# Patient Record
Sex: MALE | URBAN - METROPOLITAN AREA
[De-identification: names, ages, dates, MRNs, and addresses within clinical notes are randomized per-mention and may not be internally consistent; named-entity substitution may affect disease eponyms.]

---

## 2020-07-28 ENCOUNTER — APPOINTMENT (OUTPATIENT)
Age: 75
Setting detail: DERMATOLOGY
End: 2020-07-28

## 2020-07-28 VITALS — RESPIRATION RATE: 18 BRPM | HEIGHT: 72 IN | WEIGHT: 150 LBS

## 2020-07-28 DIAGNOSIS — D485 NEOPLASM OF UNCERTAIN BEHAVIOR OF SKIN: ICD-10-CM

## 2020-07-28 DIAGNOSIS — L57.0 ACTINIC KERATOSIS: ICD-10-CM

## 2020-07-28 PROBLEM — D48.5 NEOPLASM OF UNCERTAIN BEHAVIOR OF SKIN: Status: ACTIVE | Noted: 2020-07-28

## 2020-07-28 PROCEDURE — OTHER BIOPSY BY SHAVE METHOD: OTHER

## 2020-07-28 PROCEDURE — 17000 DESTRUCT PREMALG LESION: CPT | Mod: 59

## 2020-07-28 PROCEDURE — OTHER COUNSELING: OTHER

## 2020-07-28 PROCEDURE — 17003 DESTRUCT PREMALG LES 2-14: CPT

## 2020-07-28 PROCEDURE — OTHER LIQUID NITROGEN: OTHER

## 2020-07-28 PROCEDURE — OTHER PHOTO-DOCUMENTATION: OTHER

## 2020-07-28 PROCEDURE — 11103 TANGNTL BX SKIN EA SEP/ADDL: CPT

## 2020-07-28 PROCEDURE — 11102 TANGNTL BX SKIN SINGLE LES: CPT

## 2020-07-28 PROCEDURE — OTHER MIPS QUALITY: OTHER

## 2020-07-28 ASSESSMENT — LOCATION SIMPLE DESCRIPTION DERM
LOCATION SIMPLE: LEFT FOREHEAD
LOCATION SIMPLE: RIGHT TEMPLE
LOCATION SIMPLE: RIGHT CHEEK
LOCATION SIMPLE: LEFT EAR
LOCATION SIMPLE: SCALP
LOCATION SIMPLE: LEFT TEMPLE

## 2020-07-28 ASSESSMENT — LOCATION DETAILED DESCRIPTION DERM
LOCATION DETAILED: LEFT SUPERIOR MEDIAL FOREHEAD
LOCATION DETAILED: LEFT CENTRAL TEMPLE
LOCATION DETAILED: LEFT SUPERIOR HELIX
LOCATION DETAILED: RIGHT SUPERIOR CENTRAL MALAR CHEEK
LOCATION DETAILED: RIGHT CENTRAL TEMPLE
LOCATION DETAILED: LEFT FOREHEAD
LOCATION DETAILED: RIGHT SUPERIOR PARIETAL SCALP

## 2020-07-28 ASSESSMENT — LOCATION ZONE DERM
LOCATION ZONE: SCALP
LOCATION ZONE: EAR
LOCATION ZONE: FACE

## 2020-07-28 NOTE — PROCEDURE: MIPS QUALITY
Quality 402: Tobacco Use And Help With Quitting Among Adolescents: Patient screened for tobacco and is an ex-smoker
Quality 130: Documentation Of Current Medications In The Medical Record: Current Medications Documented
Quality 111:Pneumonia Vaccination Status For Older Adults: Pneumococcal Vaccination Previously Received
Quality 431: Preventive Care And Screening: Unhealthy Alcohol Use - Screening: Documentation of medical reason(s) for not screening for unhealthy alcohol use (eg, limited life expectancy, other medical reasons)
Detail Level: Detailed
Quality 226: Preventive Care And Screening: Tobacco Use: Screening And Cessation Intervention: Patient screened for tobacco use and is an ex/non-smoker

## 2020-07-28 NOTE — PROCEDURE: BIOPSY BY SHAVE METHOD
Hide Biopsy Depth?: No
X Size Of Lesion In Cm: 0
Consent: Written consent was obtained and risks were reviewed including but not limited to scarring, infection, bleeding, scabbing, incomplete removal, nerve damage and allergy to anesthesia.
Post-Care Instructions: I reviewed with the patient in detail post-care instructions. Patient is to keep the biopsy site dry overnight, and then apply bacitracin twice daily until healed. Patient may apply hydrogen peroxide soaks to remove any crusting.
Hemostasis: Drysol
Curettage Text: The wound bed was treated with curettage after the biopsy was performed.
Biopsy Type: H and E
Depth Of Biopsy: dermis
Information: Selecting Yes will display possible errors in your note based on the variables you have selected. This validation is only offered as a suggestion for you. PLEASE NOTE THAT THE VALIDATION TEXT WILL BE REMOVED WHEN YOU FINALIZE YOUR NOTE. IF YOU WANT TO FAX A PRELIMINARY NOTE YOU WILL NEED TO TOGGLE THIS TO 'NO' IF YOU DO NOT WANT IT IN YOUR FAXED NOTE.
Silver Nitrate Text: The wound bed was treated with silver nitrate after the biopsy was performed.
Detail Level: Detailed
Dressing: bandage
Anesthesia Type: 1% lidocaine with epinephrine
Notification Instructions: Patient will be notified of biopsy results. However, patient instructed to call the office if not contacted within 2 weeks.
Type Of Destruction Used: Curettage
Electrodesiccation And Curettage Text: The wound bed was treated with electrodesiccation and curettage after the biopsy was performed.
Biopsy Method: Dermablade
Cryotherapy Text: The wound bed was treated with cryotherapy after the biopsy was performed.
Billing Type: Third-Party Bill
Wound Care: Petrolatum
Electrodesiccation Text: The wound bed was treated with electrodesiccation after the biopsy was performed.
Was A Bandage Applied: Yes
Anesthesia Volume In Cc (Will Not Render If 0): 0.5

## 2020-08-25 ENCOUNTER — APPOINTMENT (OUTPATIENT)
Age: 75
Setting detail: DERMATOLOGY
End: 2020-08-25

## 2020-08-25 VITALS — WEIGHT: 150 LBS | TEMPERATURE: 97.5 F | RESPIRATION RATE: 18 BRPM | HEIGHT: 72 IN

## 2020-08-25 DIAGNOSIS — D485 NEOPLASM OF UNCERTAIN BEHAVIOR OF SKIN: ICD-10-CM

## 2020-08-25 PROBLEM — D48.5 NEOPLASM OF UNCERTAIN BEHAVIOR OF SKIN: Status: ACTIVE | Noted: 2020-08-25

## 2020-08-25 PROCEDURE — OTHER COUNSELING: OTHER

## 2020-08-25 PROCEDURE — 11102 TANGNTL BX SKIN SINGLE LES: CPT

## 2020-08-25 PROCEDURE — OTHER ADDITIONAL NOTES: OTHER

## 2020-08-25 PROCEDURE — OTHER BIOPSY BY SHAVE METHOD: OTHER

## 2020-08-25 ASSESSMENT — LOCATION DETAILED DESCRIPTION DERM: LOCATION DETAILED: RIGHT SUPERIOR PARIETAL SCALP

## 2020-08-25 ASSESSMENT — LOCATION SIMPLE DESCRIPTION DERM: LOCATION SIMPLE: SCALP

## 2020-08-25 ASSESSMENT — LOCATION ZONE DERM: LOCATION ZONE: SCALP

## 2020-08-25 NOTE — PROCEDURE: BIOPSY BY SHAVE METHOD
Detail Level: Detailed
Hemostasis: Drysol
Silver Nitrate Text: The wound bed was treated with silver nitrate after the biopsy was performed.
Curettage Text: The wound bed was treated with curettage after the biopsy was performed.
Bill 21757 For Specimen Handling/Conveyance To Laboratory?: no
Cryotherapy Text: The wound bed was treated with cryotherapy after the biopsy was performed.
Depth Of Biopsy: dermis
Additional Anesthesia Volume In Cc (Will Not Render If 0): 0
Information: Selecting Yes will display possible errors in your note based on the variables you have selected. This validation is only offered as a suggestion for you. PLEASE NOTE THAT THE VALIDATION TEXT WILL BE REMOVED WHEN YOU FINALIZE YOUR NOTE. IF YOU WANT TO FAX A PRELIMINARY NOTE YOU WILL NEED TO TOGGLE THIS TO 'NO' IF YOU DO NOT WANT IT IN YOUR FAXED NOTE.
Biopsy Method: Dermablade
Anesthesia Type: 1% lidocaine with epinephrine
Billing Type: Third-Party Bill
Electrodesiccation And Curettage Text: The wound bed was treated with electrodesiccation and curettage after the biopsy was performed.
Electrodesiccation Text: The wound bed was treated with electrodesiccation after the biopsy was performed.
Consent: Written consent was obtained and risks were reviewed including but not limited to scarring, infection, bleeding, scabbing, incomplete removal, nerve damage and allergy to anesthesia.
Dressing: bandage
Anesthesia Volume In Cc (Will Not Render If 0): 0.1
Wound Care: Petrolatum
Was A Bandage Applied: Yes
Type Of Destruction Used: Curettage
Post-Care Instructions: I reviewed with the patient in detail post-care instructions. Patient is to keep the biopsy site dry overnight, and then apply bacitracin twice daily until healed. Patient may apply hydrogen peroxide soaks to remove any crusting.
Notification Instructions: Patient will be notified of biopsy results. However, patient instructed to call the office if not contacted within 2 weeks.
Biopsy Type: H and E

## 2020-08-25 NOTE — PROCEDURE: ADDITIONAL NOTES
Detail Level: Simple
Additional Notes: Patient requests temple lesion be the first scheduled for excision.

## 2020-08-25 NOTE — HPI: PROCEDURE (SKIN BIOPSY)
How Severe Is Your Condition?: moderate
Additional History: Previously biopsied at visit on 07/28/20, patient presents to the clinic for additional biopsy

## 2020-09-24 ENCOUNTER — APPOINTMENT (OUTPATIENT)
Age: 75
Setting detail: DERMATOLOGY
End: 2020-09-24

## 2020-09-24 PROBLEM — C44.42 SQUAMOUS CELL CARCINOMA OF SKIN OF SCALP AND NECK: Status: ACTIVE | Noted: 2020-09-24

## 2020-09-24 PROBLEM — C44.329 SQUAMOUS CELL CARCINOMA OF SKIN OF OTHER PARTS OF FACE: Status: ACTIVE | Noted: 2020-09-24

## 2020-09-24 PROCEDURE — 99213 OFFICE O/P EST LOW 20 MIN: CPT

## 2020-09-24 PROCEDURE — OTHER ADDITIONAL NOTES: OTHER

## 2020-09-24 NOTE — PROCEDURE: ADDITIONAL NOTES
Additional Notes: Approximately 25 minutes were spent with the patient and his family member. Due to the large size of all three lesions surgical excision would require multiple skin grafts. This process was discussed at length with a patient as well as radiation therapy. Our goal would be to treat all three lesions with radiation at the same time. Hopefully this would allow for a more comfortable treatment option for the patient as well as a decreased healing time. He stated that transportation would not be an issue and that he would like to proceed with consultation
Detail Level: Simple

## 2020-12-02 ENCOUNTER — APPOINTMENT (OUTPATIENT)
Age: 75
Setting detail: DERMATOLOGY
End: 2020-12-07

## 2020-12-02 VITALS — TEMPERATURE: 97.8 F | HEIGHT: 72 IN | WEIGHT: 165 LBS | RESPIRATION RATE: 18 BRPM

## 2020-12-02 DIAGNOSIS — T81.89 OTHER COMPLICATIONS OF PROCEDURES, NOT ELSEWHERE CLASSIFIED: ICD-10-CM

## 2020-12-02 PROBLEM — T81.89XA OTHER COMPLICATIONS OF PROCEDURES, NOT ELSEWHERE CLASSIFIED, INITIAL ENCOUNTER: Status: ACTIVE | Noted: 2020-12-02

## 2020-12-02 PROCEDURE — OTHER DEBRIDEMENT OF OPEN WOUND: OTHER

## 2020-12-02 PROCEDURE — OTHER COUNSELING: OTHER

## 2020-12-02 PROCEDURE — OTHER TREATMENT REGIMEN: OTHER

## 2020-12-02 PROCEDURE — 97597 DBRDMT OPN WND 1ST 20 CM/<: CPT

## 2020-12-02 ASSESSMENT — LOCATION SIMPLE DESCRIPTION DERM
LOCATION SIMPLE: ANTERIOR SCALP
LOCATION SIMPLE: POSTERIOR SCALP
LOCATION SIMPLE: RIGHT TEMPLE

## 2020-12-02 ASSESSMENT — LOCATION ZONE DERM
LOCATION ZONE: FACE
LOCATION ZONE: SCALP

## 2020-12-02 ASSESSMENT — LOCATION DETAILED DESCRIPTION DERM
LOCATION DETAILED: POSTERIOR MID-PARIETAL SCALP
LOCATION DETAILED: RIGHT CENTRAL TEMPLE
LOCATION DETAILED: MID-FRONTAL SCALP

## 2020-12-02 NOTE — PROCEDURE: DEBRIDEMENT OF OPEN WOUND
Detail Level: Detailed
Procedure: Debridement of wound tissue less than 20sq cm
Size Of Wound In Cm2 (Optional): 0
Post-Care Instructions: I reviewed with the patient in detail post-care instructions. Patient is to keep the area dry for 48 hours, and not to engage in any swimming until the area is healed. Should the patient develop any fevers, chills, bleeding, severe pain patient will contact the office immediately.
Consent was obtained from the patient. Prior to the procedure, the treatment site was clearly identified and confirmed by the patient. All components of Universal Protocol/PAUSE Rule completed.
Render Post-Care Instructions In Note?: no

## 2020-12-02 NOTE — PROCEDURE: TREATMENT REGIMEN
Detail Level: Zone
Plan: Crusted lesions were soaked with hibiclens then debrided from radiation sites with currette. Lesion on right temple was a smooth shiny pink plaque underneath the crusting; very little bleeding. \\nAnterior scalp had very similar end appearance. \\nDebridement of crusting on scalp vertex revealed white exudate and smooth shiny ulcer. Recommended twice daily cleaning with Hibiclens and at least once daily application of silver gel to open area. \\n\\nReturn to clinic in one month for reassessment. Patient is instructed to keep 12/8/20 appointment with radiation provider.

## 2020-12-02 NOTE — HPI: SKIN LESION (SQUAMOUS CELL CARCINOMA)
How Severe Is Your Skin Cancer?: moderate
Is This A New Presentation, Or A Follow-Up?: Follow Up Squamous Cell Carcinoma
Additional History: Patient has completed 15 radiation treatments for squamous cell carcinomas (on right temple and crown/anterior scalp). Treatment completed one month ago. Patient has follow-up appointment with radiation therapy (Dr. Painter) on December 8. \\nStates less discomfort with lesions.

## 2021-01-13 ENCOUNTER — APPOINTMENT (OUTPATIENT)
Age: 76
Setting detail: DERMATOLOGY
End: 2021-01-13

## 2021-01-13 VITALS — HEIGHT: 72 IN | WEIGHT: 165 LBS | RESPIRATION RATE: 18 BRPM | TEMPERATURE: 97.8 F

## 2021-01-13 DIAGNOSIS — D485 NEOPLASM OF UNCERTAIN BEHAVIOR OF SKIN: ICD-10-CM

## 2021-01-13 DIAGNOSIS — L57.0 ACTINIC KERATOSIS: ICD-10-CM

## 2021-01-13 PROBLEM — D48.5 NEOPLASM OF UNCERTAIN BEHAVIOR OF SKIN: Status: ACTIVE | Noted: 2021-01-13

## 2021-01-13 PROCEDURE — OTHER COUNSELING: OTHER

## 2021-01-13 PROCEDURE — 11102 TANGNTL BX SKIN SINGLE LES: CPT

## 2021-01-13 PROCEDURE — OTHER LIQUID NITROGEN: OTHER

## 2021-01-13 PROCEDURE — OTHER BIOPSY BY SHAVE METHOD: OTHER

## 2021-01-13 PROCEDURE — 11103 TANGNTL BX SKIN EA SEP/ADDL: CPT

## 2021-01-13 PROCEDURE — 17000 DESTRUCT PREMALG LESION: CPT | Mod: 59

## 2021-01-13 PROCEDURE — 17003 DESTRUCT PREMALG LES 2-14: CPT

## 2021-01-13 ASSESSMENT — LOCATION DETAILED DESCRIPTION DERM
LOCATION DETAILED: LEFT SUPERIOR POSTERIOR HELIX
LOCATION DETAILED: RIGHT FOREHEAD
LOCATION DETAILED: RIGHT SUPERIOR FOREHEAD
LOCATION DETAILED: LEFT SUPERIOR MEDIAL MALAR CHEEK
LOCATION DETAILED: LEFT MEDIAL TEMPLE
LOCATION DETAILED: LEFT SUPERIOR FOREHEAD
LOCATION DETAILED: LEFT FOREHEAD
LOCATION DETAILED: SUPERIOR MID FOREHEAD
LOCATION DETAILED: LEFT MEDIAL MALAR CHEEK
LOCATION DETAILED: GLABELLA
LOCATION DETAILED: RIGHT SUPERIOR MEDIAL FOREHEAD
LOCATION DETAILED: RIGHT MEDIAL MALAR CHEEK
LOCATION DETAILED: LEFT INFERIOR FOREHEAD

## 2021-01-13 ASSESSMENT — LOCATION SIMPLE DESCRIPTION DERM
LOCATION SIMPLE: RIGHT CHEEK
LOCATION SIMPLE: LEFT TEMPLE
LOCATION SIMPLE: GLABELLA
LOCATION SIMPLE: LEFT FOREHEAD
LOCATION SIMPLE: SUPERIOR FOREHEAD
LOCATION SIMPLE: LEFT EAR
LOCATION SIMPLE: LEFT CHEEK
LOCATION SIMPLE: RIGHT FOREHEAD

## 2021-01-13 ASSESSMENT — LOCATION ZONE DERM
LOCATION ZONE: EAR
LOCATION ZONE: FACE

## 2021-01-13 NOTE — PROCEDURE: BIOPSY BY SHAVE METHOD
Was A Bandage Applied: Yes
Billing Type: Third-Party Bill
Hide Additional Anticipated Plan?: No
Cryotherapy Text: The wound bed was treated with cryotherapy after the biopsy was performed.
Silver Nitrate Text: The wound bed was treated with silver nitrate after the biopsy was performed.
Curettage Text: The wound bed was treated with curettage after the biopsy was performed.
Information: Selecting Yes will display possible errors in your note based on the variables you have selected. This validation is only offered as a suggestion for you. PLEASE NOTE THAT THE VALIDATION TEXT WILL BE REMOVED WHEN YOU FINALIZE YOUR NOTE. IF YOU WANT TO FAX A PRELIMINARY NOTE YOU WILL NEED TO TOGGLE THIS TO 'NO' IF YOU DO NOT WANT IT IN YOUR FAXED NOTE.
Anesthesia Type: 1% lidocaine with epinephrine
Consent: Written consent was obtained and risks were reviewed including but not limited to scarring, infection, bleeding, scabbing, incomplete removal, nerve damage and allergy to anesthesia.
Biopsy Type: H and E
Wound Care: Petrolatum
Dressing: bandage
X Size Of Lesion In Cm: 0
Post-Care Instructions: I reviewed with the patient in detail post-care instructions. Patient is to keep the biopsy site dry overnight, and then apply bacitracin twice daily until healed. Patient may apply hydrogen peroxide soaks to remove any crusting.
Anesthesia Volume In Cc (Will Not Render If 0): 0.5
Depth Of Biopsy: dermis
Notification Instructions: Patient will be notified of biopsy results. However, patient instructed to call the office if not contacted within 2 weeks.
Detail Level: Detailed
Biopsy Method: scissors
Electrodesiccation Text: The wound bed was treated with electrodesiccation after the biopsy was performed.
Type Of Destruction Used: Curettage
Hemostasis: Drysol
Electrodesiccation And Curettage Text: The wound bed was treated with electrodesiccation and curettage after the biopsy was performed.

## 2021-02-02 ENCOUNTER — APPOINTMENT (OUTPATIENT)
Age: 76
Setting detail: DERMATOLOGY
End: 2021-02-11

## 2021-02-02 VITALS — RESPIRATION RATE: 18 BRPM | HEIGHT: 72 IN | TEMPERATURE: 98.3 F | WEIGHT: 165 LBS

## 2021-02-02 DIAGNOSIS — D485 NEOPLASM OF UNCERTAIN BEHAVIOR OF SKIN: ICD-10-CM

## 2021-02-02 PROBLEM — D04.39 CARCINOMA IN SITU OF SKIN OF OTHER PARTS OF FACE: Status: ACTIVE | Noted: 2021-02-02

## 2021-02-02 PROBLEM — D48.5 NEOPLASM OF UNCERTAIN BEHAVIOR OF SKIN: Status: ACTIVE | Noted: 2021-02-02

## 2021-02-02 PROCEDURE — 17282 DSTR MAL LS F/E/E/N/L/M1.1-2: CPT

## 2021-02-02 PROCEDURE — 11103 TANGNTL BX SKIN EA SEP/ADDL: CPT

## 2021-02-02 PROCEDURE — OTHER CURETTAGE AND DESTRUCTION: OTHER

## 2021-02-02 PROCEDURE — 11102 TANGNTL BX SKIN SINGLE LES: CPT | Mod: 59

## 2021-02-02 PROCEDURE — OTHER BIOPSY BY SHAVE METHOD: OTHER

## 2021-02-02 PROCEDURE — OTHER COUNSELING: OTHER

## 2021-02-02 ASSESSMENT — LOCATION ZONE DERM
LOCATION ZONE: FACE
LOCATION ZONE: SCALP

## 2021-02-02 ASSESSMENT — LOCATION SIMPLE DESCRIPTION DERM
LOCATION SIMPLE: SCALP
LOCATION SIMPLE: RIGHT TEMPLE

## 2021-02-02 ASSESSMENT — LOCATION DETAILED DESCRIPTION DERM
LOCATION DETAILED: RIGHT SUPERIOR PARIETAL SCALP
LOCATION DETAILED: RIGHT CENTRAL TEMPLE

## 2021-02-02 NOTE — HPI: SKIN LESIONS
How Severe Is Your Skin Lesion?: moderate
Is This A New Presentation, Or A Follow-Up?: Skin Lesions
Additional History: lesions previously treated by radiation therapy.

## 2021-02-02 NOTE — PROCEDURE: BIOPSY BY SHAVE METHOD
Notification Instructions: Patient will be notified of biopsy results. However, patient instructed to call the office if not contacted within 2 weeks.
Validate Triangulation: No
Dressing: bandage
Anesthesia Type: 1% lidocaine with epinephrine
Additional Anesthesia Volume In Cc (Will Not Render If 0): 0
Electrodesiccation Text: The wound bed was treated with electrodesiccation after the biopsy was performed.
Electrodesiccation And Curettage Text: The wound bed was treated with electrodesiccation and curettage after the biopsy was performed.
Hemostasis: Drysol
Anesthesia Volume In Cc (Will Not Render If 0): 0.5
Billing Type: Third-Party Bill
Cryotherapy Text: The wound bed was treated with cryotherapy after the biopsy was performed.
Depth Of Biopsy: dermis
Biopsy Type: H and E
Type Of Destruction Used: Curettage
Silver Nitrate Text: The wound bed was treated with silver nitrate after the biopsy was performed.
Consent: Written consent was obtained and risks were reviewed including but not limited to scarring, infection, bleeding, scabbing, incomplete removal, nerve damage and allergy to anesthesia.
Was A Bandage Applied: Yes
Biopsy Method: scissors
Curettage Text: The wound bed was treated with curettage after the biopsy was performed.
Wound Care: Petrolatum
Post-Care Instructions: I reviewed with the patient in detail post-care instructions. Patient is to keep the biopsy site dry overnight, and then apply bacitracin twice daily until healed. Patient may apply hydrogen peroxide soaks to remove any crusting.
Information: Selecting Yes will display possible errors in your note based on the variables you have selected. This validation is only offered as a suggestion for you. PLEASE NOTE THAT THE VALIDATION TEXT WILL BE REMOVED WHEN YOU FINALIZE YOUR NOTE. IF YOU WANT TO FAX A PRELIMINARY NOTE YOU WILL NEED TO TOGGLE THIS TO 'NO' IF YOU DO NOT WANT IT IN YOUR FAXED NOTE.
Detail Level: Detailed

## 2021-02-02 NOTE — PROCEDURE: CURETTAGE AND DESTRUCTION
Concentration (Mg/Ml Or Millions Of Plaque Forming Units/Cc): 0.01
Render Post-Care Instructions In Note?: no
Bill As A Line Item Or As Units: Line Item
Biopsy Photograph Reviewed: Yes
Anesthesia Type: 1% lidocaine with epinephrine
Post-Care Instructions: I reviewed with the patient in detail post-care instructions. Patient is to keep the area dry for 48 hours, and not to engage in any swimming until the area is healed. Should the patient develop any fevers, chills, bleeding, severe pain patient will contact the office immediately.
Detail Level: Detailed
Number Of Curettages: 4
What Was Performed First?: Curettage
Body Location Override (Optional - Billing Will Still Be Based On Selected Body Map Location If Applicable): right superior central forehead
Size Of Lesion In Cm: 1.5
Consent was obtained from the patient. The risks, benefits and alternatives to therapy were discussed in detail. Specifically, the risks of infection, scarring, bleeding, prolonged wound healing, nerve injury, incomplete removal, allergy to anesthesia and recurrence were addressed. Alternatives to ED&C, such as: surgical removal and XRT were also discussed.  Prior to the procedure, the treatment site was clearly identified and confirmed by the patient. All components of Universal Protocol/PAUSE Rule completed.
Additional Information: (Optional): The wound was cleaned, and a pressure dressing was applied.  The patient received detailed post-op instructions.
Total Volume (Ccs): 1
Cautery Type: electrodesiccation

## 2021-02-02 NOTE — HPI: SKIN LESION (SQUAMOUS CELL CARCINOMA)
How Severe Is Your Skin Cancer?: mild
Is This A New Presentation, Or A Follow-Up?: Squamous Cell Carcinoma
Additional History: Biopsy proven SCCIS.

## 2021-02-09 ENCOUNTER — RX ONLY (RX ONLY)
Age: 76
End: 2021-02-09

## 2021-02-09 RX ORDER — HYDROCORTISONE 25 MG/G
CREAM TOPICAL
Qty: 1 | Refills: 2 | Status: ERX | COMMUNITY
Start: 2021-02-09

## 2021-02-24 ENCOUNTER — RX ONLY (RX ONLY)
Age: 76
End: 2021-02-24

## 2021-02-24 RX ORDER — HYDROCORTISONE 25 MG/G
OINTMENT TOPICAL
Qty: 1 | Refills: 2 | Status: ERX | COMMUNITY
Start: 2021-02-24

## 2021-04-13 ENCOUNTER — APPOINTMENT (OUTPATIENT)
Age: 76
Setting detail: DERMATOLOGY
End: 2021-04-13

## 2021-04-13 VITALS — RESPIRATION RATE: 18 BRPM | TEMPERATURE: 98.3 F | HEIGHT: 72 IN | WEIGHT: 165 LBS

## 2021-04-13 DIAGNOSIS — L57.0 ACTINIC KERATOSIS: ICD-10-CM

## 2021-04-13 DIAGNOSIS — L30.8 OTHER SPECIFIED DERMATITIS: ICD-10-CM

## 2021-04-13 PROCEDURE — 17000 DESTRUCT PREMALG LESION: CPT

## 2021-04-13 PROCEDURE — OTHER COUNSELING: OTHER

## 2021-04-13 PROCEDURE — 17003 DESTRUCT PREMALG LES 2-14: CPT

## 2021-04-13 PROCEDURE — OTHER LIQUID NITROGEN: OTHER

## 2021-04-13 PROCEDURE — OTHER ADDITIONAL NOTES: OTHER

## 2021-04-13 PROCEDURE — OTHER MEDICATION COUNSELING: OTHER

## 2021-04-13 PROCEDURE — OTHER PRESCRIPTION MEDICATION MANAGEMENT: OTHER

## 2021-04-13 PROCEDURE — 99213 OFFICE O/P EST LOW 20 MIN: CPT | Mod: 25

## 2021-04-13 ASSESSMENT — LOCATION ZONE DERM
LOCATION ZONE: SCALP
LOCATION ZONE: ARM
LOCATION ZONE: NOSE
LOCATION ZONE: HAND
LOCATION ZONE: FACE

## 2021-04-13 ASSESSMENT — LOCATION DETAILED DESCRIPTION DERM
LOCATION DETAILED: LEFT ANTERIOR PROXIMAL UPPER ARM
LOCATION DETAILED: RIGHT CENTRAL MALAR CHEEK
LOCATION DETAILED: RIGHT RADIAL DORSAL HAND
LOCATION DETAILED: RIGHT DISTAL DORSAL FOREARM
LOCATION DETAILED: POSTERIOR MID-PARIETAL SCALP
LOCATION DETAILED: NASAL DORSUM

## 2021-04-13 ASSESSMENT — LOCATION SIMPLE DESCRIPTION DERM
LOCATION SIMPLE: RIGHT CHEEK
LOCATION SIMPLE: POSTERIOR SCALP
LOCATION SIMPLE: NOSE
LOCATION SIMPLE: RIGHT FOREARM
LOCATION SIMPLE: LEFT UPPER ARM
LOCATION SIMPLE: RIGHT HAND

## 2021-04-13 NOTE — PROCEDURE: MEDICATION COUNSELING
Xelhemaz Pregnancy And Lactation Text: This medication is Pregnancy Category D and is not considered safe during pregnancy.  The risk during breast feeding is also uncertain.

## 2021-04-13 NOTE — PROCEDURE: PRESCRIPTION MEDICATION MANAGEMENT
Render In Strict Bullet Format?: No
Continue Regimen: Hydrocortisone 2.5% cream, apply to affected areas of scalp twice a day x 14 days, stop x 7 days, repeat as needed \\nSilver gel, as directed
Plan: Return to clinic in 3 months for full skin exam.
Detail Level: Zone

## 2021-04-13 NOTE — PROCEDURE: ADDITIONAL NOTES
Additional Notes: Cryosurgery done as a courtesy today. Patient tolerated procedure well.
Render Risk Assessment In Note?: no
Detail Level: Simple

## 2021-07-13 ENCOUNTER — APPOINTMENT (OUTPATIENT)
Dept: URBAN - METROPOLITAN AREA CLINIC 265 | Age: 76
Setting detail: DERMATOLOGY
End: 2021-07-13

## 2021-07-13 VITALS — HEIGHT: 72 IN | RESPIRATION RATE: 18 BRPM | WEIGHT: 165 LBS

## 2021-07-13 DIAGNOSIS — L57.0 ACTINIC KERATOSIS: ICD-10-CM

## 2021-07-13 DIAGNOSIS — L30.8 OTHER SPECIFIED DERMATITIS: ICD-10-CM

## 2021-07-13 PROCEDURE — OTHER PRESCRIPTION MEDICATION MANAGEMENT: OTHER

## 2021-07-13 PROCEDURE — OTHER LIQUID NITROGEN: OTHER

## 2021-07-13 PROCEDURE — 17003 DESTRUCT PREMALG LES 2-14: CPT

## 2021-07-13 PROCEDURE — OTHER COUNSELING: OTHER

## 2021-07-13 PROCEDURE — 99213 OFFICE O/P EST LOW 20 MIN: CPT | Mod: 25

## 2021-07-13 PROCEDURE — OTHER MEDICATION COUNSELING: OTHER

## 2021-07-13 PROCEDURE — OTHER ADDITIONAL NOTES: OTHER

## 2021-07-13 PROCEDURE — 17000 DESTRUCT PREMALG LESION: CPT

## 2021-07-13 ASSESSMENT — LOCATION ZONE DERM
LOCATION ZONE: NOSE
LOCATION ZONE: FACE
LOCATION ZONE: SCALP
LOCATION ZONE: ARM
LOCATION ZONE: EAR

## 2021-07-13 ASSESSMENT — LOCATION SIMPLE DESCRIPTION DERM
LOCATION SIMPLE: LEFT EAR
LOCATION SIMPLE: NOSE
LOCATION SIMPLE: LEFT TEMPLE
LOCATION SIMPLE: RIGHT FOREHEAD
LOCATION SIMPLE: LEFT FOREARM
LOCATION SIMPLE: RIGHT CHEEK
LOCATION SIMPLE: POSTERIOR SCALP

## 2021-07-13 ASSESSMENT — LOCATION DETAILED DESCRIPTION DERM
LOCATION DETAILED: LEFT PROXIMAL DORSAL FOREARM
LOCATION DETAILED: NASAL DORSUM
LOCATION DETAILED: RIGHT SUPERIOR LATERAL MALAR CHEEK
LOCATION DETAILED: LEFT CENTRAL TEMPLE
LOCATION DETAILED: RIGHT MEDIAL FOREHEAD
LOCATION DETAILED: POSTERIOR MID-PARIETAL SCALP
LOCATION DETAILED: RIGHT CENTRAL MALAR CHEEK
LOCATION DETAILED: LEFT ANTIHELIX

## 2021-07-13 NOTE — PROCEDURE: LIQUID NITROGEN
Show Applicator Variable?: Yes
Render Post-Care Instructions In Note?: no
Post-Care Instructions: I reviewed with the patient in detail post-care instructions. Patient is to wear sunprotection, and avoid picking at any of the treated lesions. Pt may apply Vaseline to crusted or scabbing areas.
Consent: The patient's consent was obtained including but not limited to risks of crusting, scabbing, blistering, scarring, darker or lighter pigmentary change, recurrence, incomplete removal and infection.
Duration Of Freeze Thaw-Cycle (Seconds): 0
Detail Level: Simple

## 2021-07-13 NOTE — PROCEDURE: PRESCRIPTION MEDICATION MANAGEMENT
Continue Regimen: Hydrocortisone 2.5% cream, apply to affected areas of scalp twice a day x 14 days, stop x 7 days, repeat as needed \\nSilver gel, as directed
Render In Strict Bullet Format?: No
Plan: Return to clinic in 3 months. Patient declining full skin exams at this time.
Detail Level: Zone

## 2021-07-13 NOTE — PROCEDURE: MEDICATION COUNSELING
I spoke with Ms. Muse's son and he states that his mom is still in a lot of pain. He states that his mom told him the pain medication helps some but it's not strong enough. Protopic Pregnancy And Lactation Text: This medication is Pregnancy Category C. It is unknown if this medication is excreted in breast milk when applied topically.

## 2021-10-19 ENCOUNTER — APPOINTMENT (OUTPATIENT)
Dept: URBAN - METROPOLITAN AREA CLINIC 265 | Age: 76
Setting detail: DERMATOLOGY
End: 2021-10-19

## 2021-10-19 VITALS — HEIGHT: 72 IN | RESPIRATION RATE: 18 BRPM | WEIGHT: 160 LBS

## 2021-10-19 DIAGNOSIS — L57.0 ACTINIC KERATOSIS: ICD-10-CM

## 2021-10-19 DIAGNOSIS — L30.8 OTHER SPECIFIED DERMATITIS: ICD-10-CM

## 2021-10-19 PROCEDURE — OTHER MEDICATION COUNSELING: OTHER

## 2021-10-19 PROCEDURE — OTHER PRESCRIPTION MEDICATION MANAGEMENT: OTHER

## 2021-10-19 PROCEDURE — 17000 DESTRUCT PREMALG LESION: CPT

## 2021-10-19 PROCEDURE — OTHER LIQUID NITROGEN: OTHER

## 2021-10-19 PROCEDURE — 99213 OFFICE O/P EST LOW 20 MIN: CPT | Mod: 25

## 2021-10-19 PROCEDURE — 17003 DESTRUCT PREMALG LES 2-14: CPT

## 2021-10-19 PROCEDURE — OTHER COUNSELING: OTHER

## 2021-10-19 ASSESSMENT — LOCATION SIMPLE DESCRIPTION DERM
LOCATION SIMPLE: LEFT EAR
LOCATION SIMPLE: RIGHT CHEEK
LOCATION SIMPLE: NOSE
LOCATION SIMPLE: LEFT FOREHEAD
LOCATION SIMPLE: RIGHT FOREHEAD
LOCATION SIMPLE: POSTERIOR SCALP
LOCATION SIMPLE: SCALP

## 2021-10-19 ASSESSMENT — LOCATION DETAILED DESCRIPTION DERM
LOCATION DETAILED: RIGHT CENTRAL PARIETAL SCALP
LOCATION DETAILED: RIGHT INFERIOR FOREHEAD
LOCATION DETAILED: LEFT INFERIOR POSTAURICULAR SKIN
LOCATION DETAILED: NASAL ROOT
LOCATION DETAILED: RIGHT SUPERIOR MEDIAL MALAR CHEEK
LOCATION DETAILED: LEFT SUPERIOR HELIX
LOCATION DETAILED: RIGHT SUPERIOR PARIETAL SCALP
LOCATION DETAILED: POSTERIOR MID-PARIETAL SCALP
LOCATION DETAILED: LEFT SUPERIOR FOREHEAD
LOCATION DETAILED: LEFT SUPERIOR MEDIAL FOREHEAD

## 2021-10-19 ASSESSMENT — LOCATION ZONE DERM
LOCATION ZONE: EAR
LOCATION ZONE: NOSE
LOCATION ZONE: FACE
LOCATION ZONE: SCALP

## 2021-10-19 NOTE — PROCEDURE: MEDICATION COUNSELING
Pt called stating that she has a order from her job to have a TB Test done. Pt wants to know do she also need a order from Dr. Niraj Cruz to have a TB Test done? Pt also wants an order placed to have her A1C checked. Please advise. Pt would like a call once the orders have been placed. Rituxan Counseling:  I discussed with the patient the risks of Rituxan infusions. Side effects can include infusion reactions, severe drug rashes including mucocutaneous reactions, reactivation of latent hepatitis and other infections and rarely progressive multifocal leukoencephalopathy.  All of the patient's questions and concerns were addressed.

## 2021-10-19 NOTE — PROCEDURE: MEDICATION COUNSELING
A positive Nsaids Pregnancy And Lactation Text: These medications are considered safe up to 30 weeks gestation. It is excreted in breast milk.

## 2021-10-19 NOTE — PROCEDURE: LIQUID NITROGEN
Render Note In Bullet Format When Appropriate: No
Duration Of Freeze Thaw-Cycle (Seconds): 0
Consent: The patient's consent was obtained including but not limited to risks of crusting, scabbing, blistering, scarring, darker or lighter pigmentary change, recurrence, incomplete removal and infection.
Detail Level: Simple
Post-Care Instructions: I reviewed with the patient in detail post-care instructions. Patient is to wear sunprotection, and avoid picking at any of the treated lesions. Pt may apply Vaseline to crusted or scabbing areas.
Show Aperture Variable?: Yes

## 2021-10-19 NOTE — PROCEDURE: PRESCRIPTION MEDICATION MANAGEMENT
Plan: Return to clinic in 3 months. Patient declining full skin exams at this time.
Continue Regimen: Hydrocortisone 2.5% cream, apply to affected areas of scalp twice a day x 14 days, stop x 7 days, repeat as needed
Detail Level: Zone
Render In Strict Bullet Format?: No

## 2021-12-08 ENCOUNTER — APPOINTMENT (OUTPATIENT)
Dept: URBAN - METROPOLITAN AREA CLINIC 265 | Age: 76
Setting detail: DERMATOLOGY
End: 2021-12-08

## 2021-12-08 VITALS — RESPIRATION RATE: 18 BRPM | WEIGHT: 160 LBS | HEIGHT: 72 IN

## 2021-12-08 DIAGNOSIS — B35.3 TINEA PEDIS: ICD-10-CM

## 2021-12-08 PROCEDURE — OTHER PRESCRIPTION MEDICATION MANAGEMENT: OTHER

## 2021-12-08 PROCEDURE — OTHER PRESCRIPTION: OTHER

## 2021-12-08 PROCEDURE — OTHER MEDICATION COUNSELING: OTHER

## 2021-12-08 PROCEDURE — OTHER COUNSELING: OTHER

## 2021-12-08 PROCEDURE — 99213 OFFICE O/P EST LOW 20 MIN: CPT

## 2021-12-08 PROCEDURE — OTHER MIPS QUALITY: OTHER

## 2021-12-08 RX ORDER — NAFTIFINE HYDROCHLORIDE 2 G/100G
GEL TOPICAL QD
Qty: 1 | Refills: 3 | Status: ERX | COMMUNITY
Start: 2021-12-08

## 2021-12-08 RX ORDER — DOXYCYCLINE HYCLATE 100 MG/1
CAPSULE, GELATIN COATED ORAL
Qty: 20 | Refills: 0 | Status: ERX | COMMUNITY
Start: 2021-12-08

## 2021-12-08 ASSESSMENT — LOCATION DETAILED DESCRIPTION DERM
LOCATION DETAILED: LEFT DORSAL FOOT
LOCATION DETAILED: RIGHT DORSAL FOOT

## 2021-12-08 ASSESSMENT — LOCATION SIMPLE DESCRIPTION DERM
LOCATION SIMPLE: LEFT FOOT
LOCATION SIMPLE: RIGHT FOOT

## 2021-12-08 ASSESSMENT — LOCATION ZONE DERM: LOCATION ZONE: FEET

## 2021-12-08 NOTE — PROCEDURE: PRESCRIPTION MEDICATION MANAGEMENT
Detail Level: Zone
Render In Strict Bullet Format?: No
Initiate Treatment: Naftin gel, apply to feet once daily until resolved, then 1 week past clearance \\nDoxycycline 100mg, take 1 pill by mouth twice a day with food x 10 days
Plan: Return to clinic in 6 weeks.

## 2022-01-04 NOTE — PROCEDURE: MEDICATION COUNSELING
How Severe Is Your Skin Lesion?: moderate Has Your Skin Lesion Been Treated?: not been treated Is This A New Presentation, Or A Follow-Up?: Skin Lesions Clindamycin Pregnancy And Lactation Text: This medication can be used in pregnancy if certain situations. Clindamycin is also present in breast milk.

## 2022-01-21 NOTE — PROCEDURE: MEDICATION COUNSELING
show Cimzia Pregnancy And Lactation Text: This medication crosses the placenta but can be considered safe in certain situations. Cimzia may be excreted in breast milk.

## 2022-01-31 ENCOUNTER — APPOINTMENT (OUTPATIENT)
Dept: URBAN - METROPOLITAN AREA CLINIC 265 | Age: 77
Setting detail: DERMATOLOGY
End: 2022-01-31

## 2022-01-31 VITALS — HEIGHT: 72 IN | RESPIRATION RATE: 18 BRPM | WEIGHT: 160 LBS

## 2022-01-31 VITALS — WEIGHT: 160 LBS | HEIGHT: 72 IN

## 2022-01-31 VITALS — HEIGHT: 72 IN | WEIGHT: 160 LBS

## 2022-01-31 DIAGNOSIS — B35.3 TINEA PEDIS: ICD-10-CM

## 2022-01-31 PROCEDURE — OTHER COUNSELING: OTHER

## 2022-01-31 PROCEDURE — OTHER MIPS QUALITY: OTHER

## 2022-01-31 PROCEDURE — OTHER PRESCRIPTION: OTHER

## 2022-01-31 PROCEDURE — OTHER PRESCRIPTION MEDICATION MANAGEMENT: OTHER

## 2022-01-31 PROCEDURE — OTHER MEDICATION COUNSELING: OTHER

## 2022-01-31 PROCEDURE — 99213 OFFICE O/P EST LOW 20 MIN: CPT

## 2022-01-31 RX ORDER — TERBINAFINE HYDROCHLORIDE 250 MG/1
TABLET ORAL
Qty: 14 | Refills: 0 | Status: ERX | COMMUNITY
Start: 2022-01-31

## 2022-01-31 ASSESSMENT — LOCATION DETAILED DESCRIPTION DERM
LOCATION DETAILED: LEFT DORSAL FOOT
LOCATION DETAILED: RIGHT DORSAL FOOT

## 2022-01-31 ASSESSMENT — LOCATION SIMPLE DESCRIPTION DERM
LOCATION SIMPLE: LEFT FOOT
LOCATION SIMPLE: RIGHT FOOT

## 2022-01-31 ASSESSMENT — LOCATION ZONE DERM: LOCATION ZONE: FEET

## 2022-01-31 NOTE — HPI: INFECTION (TINEA)
How Severe Is Your Dermatophytosis?: moderate
Is This A New Presentation, Or A Follow-Up?: Follow Up Tinea Pedis
Additional History: Patient was improving on naftin x 5 weeks. He discontinued and rash recurred within 2 days. \\n\\nPreviously treated with oral prednisone.

## 2022-01-31 NOTE — PROCEDURE: PRESCRIPTION MEDICATION MANAGEMENT
Plan: Return to clinic in 6 weeks.
Render In Strict Bullet Format?: No
Initiate Treatment: terbinafine HCl 250 mg tablet, Take 1 tablet PO once daily x 2 weeks.
Continue Regimen: Naftin gel, apply to feet once daily until resolved, then 1 week past clearance
Detail Level: Zone

## 2022-02-02 ENCOUNTER — RX ONLY (RX ONLY)
Age: 77
End: 2022-02-02

## 2022-02-02 RX ORDER — KETOCONAZOLE 20 MG/G
CREAM TOPICAL
Qty: 60 | Refills: 2 | Status: ERX | COMMUNITY
Start: 2022-02-02

## 2022-02-07 ENCOUNTER — APPOINTMENT (OUTPATIENT)
Dept: URBAN - METROPOLITAN AREA CLINIC 265 | Age: 77
Setting detail: DERMATOLOGY
End: 2022-02-07

## 2022-02-07 DIAGNOSIS — L30.9 DERMATITIS, UNSPECIFIED: ICD-10-CM

## 2022-02-07 PROCEDURE — OTHER BIOPSY BY PUNCH METHOD: OTHER

## 2022-02-07 PROCEDURE — 11104 PUNCH BX SKIN SINGLE LESION: CPT

## 2022-02-07 PROCEDURE — 11105 PUNCH BX SKIN EA SEP/ADDL: CPT

## 2022-02-07 PROCEDURE — OTHER COUNSELING: OTHER

## 2022-02-07 PROCEDURE — OTHER ORDER TESTS: OTHER

## 2022-02-07 PROCEDURE — OTHER PRESCRIPTION: OTHER

## 2022-02-07 PROCEDURE — OTHER PRESCRIPTION MEDICATION MANAGEMENT: OTHER

## 2022-02-07 RX ORDER — PREDNISONE 10 MG/1
TABLET ORAL
Qty: 40 | Refills: 0 | Status: ERX | COMMUNITY
Start: 2022-02-07

## 2022-02-07 ASSESSMENT — LOCATION DETAILED DESCRIPTION DERM
LOCATION DETAILED: RIGHT MEDIAL DISTAL PRETIBIAL REGION
LOCATION DETAILED: RIGHT DORSAL FOOT
LOCATION DETAILED: RIGHT DISTAL PRETIBIAL REGION
LOCATION DETAILED: LEFT DORSAL FOOT

## 2022-02-07 ASSESSMENT — LOCATION SIMPLE DESCRIPTION DERM
LOCATION SIMPLE: RIGHT FOOT
LOCATION SIMPLE: RIGHT PRETIBIAL REGION
LOCATION SIMPLE: LEFT FOOT

## 2022-02-07 ASSESSMENT — LOCATION ZONE DERM
LOCATION ZONE: FEET
LOCATION ZONE: LEG

## 2022-02-07 NOTE — HPI: RASH
What Type Of Note Output Would You Prefer (Optional)?: Bullet Format
How Severe Is Your Rash?: moderate
Is This A New Presentation, Or A Follow-Up?: Follow Up Rash
Additional History: Patient had similar rash one year ago which cleared with prednisone. It recurred a few months ago and he was treated with prednisone by [facility] and it did not improve. He was treated here with doxycycline and naftin two months ago and it cleared then recurred. Started on terbinafine and patient discontinued due to worsening. Today rash appears like a vasculitis. Biopsying.

## 2022-02-07 NOTE — PROCEDURE: BIOPSY BY PUNCH METHOD
Home Suture Removal Text: Patient was provided a home suture removal kit and will remove their sutures at home.  If they have any questions or difficulties they will call the office.
Was A Bandage Applied: Yes
Hemostasis: None
Billing Type: Third-Party Bill
Information: Selecting Yes will display possible errors in your note based on the variables you have selected. This validation is only offered as a suggestion for you. PLEASE NOTE THAT THE VALIDATION TEXT WILL BE REMOVED WHEN YOU FINALIZE YOUR NOTE. IF YOU WANT TO FAX A PRELIMINARY NOTE YOU WILL NEED TO TOGGLE THIS TO 'NO' IF YOU DO NOT WANT IT IN YOUR FAXED NOTE.
Anesthesia Volume In Cc (Will Not Render If 0): 0.5
Consent: Written consent was obtained and risks were reviewed including but not limited to scarring, infection, bleeding, scabbing, incomplete removal, nerve damage and allergy to anesthesia.
Punch Size In Mm: 4
Render In Bullet Format When Appropriate: No
Post-Care Instructions: I reviewed with the patient in detail post-care instructions. Patient is to keep the biopsy site dry overnight, and then apply bacitracin twice daily until healed. Patient may apply hydrogen peroxide soaks to remove any crusting.
Suture Removal: 14 days
Wound Care: Petrolatum
Detail Level: Detailed
Additional Anesthesia Volume In Cc (Will Not Render If 0): 0
Biopsy Type: H and E
Epidermal Sutures: 4-0 Ethilon
Anesthesia Type: 1% lidocaine with epinephrine
Notification Instructions: Patient will be notified of biopsy results. However, patient instructed to call the office if not contacted within 2 weeks.
Dressing: bandage
Biopsy Type: DIF (perilesional)

## 2022-02-14 ENCOUNTER — APPOINTMENT (OUTPATIENT)
Dept: URBAN - METROPOLITAN AREA CLINIC 265 | Age: 77
Setting detail: DERMATOLOGY
End: 2022-02-14

## 2022-02-14 DIAGNOSIS — Z48.02 ENCOUNTER FOR REMOVAL OF SUTURES: ICD-10-CM

## 2022-02-14 PROCEDURE — OTHER MIPS QUALITY: OTHER

## 2022-02-14 PROCEDURE — OTHER SUTURE REMOVAL (GLOBAL PERIOD): OTHER

## 2022-02-14 PROCEDURE — 99024 POSTOP FOLLOW-UP VISIT: CPT

## 2022-02-14 PROCEDURE — OTHER COUNSELING: OTHER

## 2022-02-14 ASSESSMENT — LOCATION SIMPLE DESCRIPTION DERM: LOCATION SIMPLE: RIGHT ANKLE

## 2022-02-14 ASSESSMENT — LOCATION DETAILED DESCRIPTION DERM: LOCATION DETAILED: RIGHT ANKLE

## 2022-02-14 ASSESSMENT — LOCATION ZONE DERM: LOCATION ZONE: LEG

## 2022-02-14 NOTE — PROCEDURE: SUTURE REMOVAL (GLOBAL PERIOD)
Detail Level: Zone
Add 71042 Cpt? (Important Note: In 2017 The Use Of 75988 Is Being Tracked By Cms To Determine Future Global Period Reimbursement For Global Periods): no

## 2022-02-24 ENCOUNTER — APPOINTMENT (OUTPATIENT)
Dept: URBAN - METROPOLITAN AREA CLINIC 265 | Age: 77
Setting detail: DERMATOLOGY
End: 2022-02-28

## 2022-02-24 DIAGNOSIS — L30.9 DERMATITIS, UNSPECIFIED: ICD-10-CM

## 2022-02-24 PROCEDURE — OTHER ADDITIONAL NOTES: OTHER

## 2022-02-24 PROCEDURE — OTHER PRESCRIPTION MEDICATION MANAGEMENT: OTHER

## 2022-02-24 PROCEDURE — OTHER CONSENT FOR TELEMEDICINE VISIT OBTAINED: OTHER

## 2022-02-24 PROCEDURE — OTHER TELEPHONE E/M SERVICE: OTHER

## 2022-02-24 PROCEDURE — OTHER PRESCRIPTION: OTHER

## 2022-02-24 PROCEDURE — 99442: CPT

## 2022-02-24 PROCEDURE — OTHER MEDICATION COUNSELING: OTHER

## 2022-02-24 RX ORDER — TRIAMCINOLONE ACETONIDE 1 MG/G
CREAM TOPICAL
Qty: 45 | Refills: 3 | Status: ERX | COMMUNITY
Start: 2022-02-24

## 2022-02-24 ASSESSMENT — LOCATION SIMPLE DESCRIPTION DERM
LOCATION SIMPLE: RIGHT FOOT
LOCATION SIMPLE: LEFT FOOT

## 2022-02-24 ASSESSMENT — LOCATION DETAILED DESCRIPTION DERM
LOCATION DETAILED: RIGHT DORSAL FOOT
LOCATION DETAILED: LEFT DORSAL FOOT

## 2022-02-24 ASSESSMENT — LOCATION ZONE DERM: LOCATION ZONE: FEET

## 2022-02-24 NOTE — PROCEDURE: MEDICATION COUNSELING
May have  Dr. Laura Breaux do cataract surgery OD due to corneal implant. Rifampin Pregnancy And Lactation Text: This medication is Pregnancy Category C and it isn't know if it is safe during pregnancy. It is also excreted in breast milk and should not be used if you are breast feeding.

## 2022-02-24 NOTE — PROCEDURE: ADDITIONAL NOTES
Detail Level: Zone
Additional Notes: pathology reading\\n\\nLYMPHOCYTIC PERIVASCULAR INFLAMMATION WITH ERYTHROCYTE EXTRAVASATION AND\\nSTASIS CHANGES (SEE COMMENT).\\nCOMMENTS:\\nThe clinical photos were reviewed. Histologic sections show a lymphocytic perivascular infiltrate along \\nthe superficial vascular plexus, with associated extravasation of erythrocytes, scattered nuclear debris \\n(karyorrhexis), and stasis changes. The infiltrate is composed predominantly of lymphocytes, with rare \\nneutrophils. The changes are not entirely diagnostic, but suspicious for stasis dermatitis with associated \\npigmented purpura. Necrotizing features or acute vasculitis are not seen on multiple deeper sections \\n(x12). Iron stain is negative for hemosiderin deposition. Clinicopathologic correlation is recommended
Render Risk Assessment In Note?: no

## 2022-02-24 NOTE — HPI: RASH
How Severe Is Your Rash?: moderate
Physician at bedside.  Patient reports she feels better.    
Is This A New Presentation, Or A Follow-Up?: Follow Up Rash

## 2022-02-24 NOTE — PROCEDURE: TELEPHONE E/M SERVICE
Telephone E/M Code: CPT 52514: 11-20 minutes: Medical discussion, for an established patient, parent, or guardian Telephone E/M Code: CPT 44663: 11-20 minutes: Medical discussion, for an established patient, parent, or guardian

## 2022-02-24 NOTE — PROCEDURE: PRESCRIPTION MEDICATION MANAGEMENT
Initiate Treatment: triamcinolone, apply bid x 2 weeks, stop x 1 week, repeat\\n
Detail Level: Simple
Render In Strict Bullet Format?: No
Plan: instructed patient to get [place] soaks and use for 30 min 1-2x daily x 5 days, then every other day. \\nDiscussed lab results with patient. Sending labs to Dr. Doyle to f/u on abnormal kidney fxn and abnml CBC. AlkPhos is high as well. \\nPositive anti-Ro. Other autoimmune markers are not back yet from lab. Other labs are in chart. \\n\\nAdvised that we will reevaluate in 3 weeks. Patient is to RTC for f/u. RTC sooner if rash worsens.

## 2022-02-24 NOTE — PROCEDURE: MEDICATION COUNSELING
EMT/paramedic Methotrexate Pregnancy And Lactation Text: This medication is Pregnancy Category X and is known to cause fetal harm. This medication is excreted in breast milk.

## 2022-02-24 NOTE — PROCEDURE: TELEPHONE E/M SERVICE
Comments: Spoke with Mr. Laughlin and his son-in-law Jose for 15 minutes. Jose called the clinic. \\nThey were in their home. I was in the office.

## 2022-03-14 ENCOUNTER — APPOINTMENT (OUTPATIENT)
Dept: URBAN - METROPOLITAN AREA CLINIC 265 | Age: 77
Setting detail: DERMATOLOGY
End: 2022-03-14

## 2022-03-14 VITALS — RESPIRATION RATE: 18 BRPM | WEIGHT: 315 LBS | HEIGHT: 72 IN

## 2022-03-14 DIAGNOSIS — L57.0 ACTINIC KERATOSIS: ICD-10-CM

## 2022-03-14 DIAGNOSIS — L30.9 DERMATITIS, UNSPECIFIED: ICD-10-CM

## 2022-03-14 PROCEDURE — 17003 DESTRUCT PREMALG LES 2-14: CPT

## 2022-03-14 PROCEDURE — OTHER MEDICATION COUNSELING: OTHER

## 2022-03-14 PROCEDURE — OTHER LIQUID NITROGEN: OTHER

## 2022-03-14 PROCEDURE — 17000 DESTRUCT PREMALG LESION: CPT

## 2022-03-14 PROCEDURE — OTHER MIPS QUALITY: OTHER

## 2022-03-14 PROCEDURE — OTHER COUNSELING: OTHER

## 2022-03-14 PROCEDURE — OTHER PRESCRIPTION MEDICATION MANAGEMENT: OTHER

## 2022-03-14 PROCEDURE — 99213 OFFICE O/P EST LOW 20 MIN: CPT | Mod: 25

## 2022-03-14 ASSESSMENT — LOCATION ZONE DERM
LOCATION ZONE: ARM
LOCATION ZONE: SCALP
LOCATION ZONE: EAR
LOCATION ZONE: FEET

## 2022-03-14 ASSESSMENT — LOCATION SIMPLE DESCRIPTION DERM
LOCATION SIMPLE: RIGHT SCALP
LOCATION SIMPLE: LEFT EAR
LOCATION SIMPLE: LEFT FOOT
LOCATION SIMPLE: SCALP
LOCATION SIMPLE: RIGHT FOOT
LOCATION SIMPLE: RIGHT FOREARM

## 2022-03-14 ASSESSMENT — LOCATION DETAILED DESCRIPTION DERM
LOCATION DETAILED: RIGHT DORSAL FOOT
LOCATION DETAILED: LEFT SUPERIOR HELIX
LOCATION DETAILED: LEFT DORSAL FOOT
LOCATION DETAILED: RIGHT DISTAL DORSAL FOREARM
LOCATION DETAILED: RIGHT CENTRAL PARIETAL SCALP
LOCATION DETAILED: RIGHT SUPERIOR PARIETAL SCALP
LOCATION DETAILED: RIGHT MEDIAL FRONTAL SCALP

## 2022-03-14 NOTE — PROCEDURE: PRESCRIPTION MEDICATION MANAGEMENT
Detail Level: Simple
Continue Regimen: Triamcinolone cream, apply to affected areas of feet twice a day x 2 weeks, stop x 1 week, repeat cycle as needed for flares\\n[place] soaks, every other day until healed
Plan: Return to clinic in 8 weeks or sooner if rash worsens.
Render In Strict Bullet Format?: No

## 2022-03-14 NOTE — PROCEDURE: LIQUID NITROGEN
Show Aperture Variable?: Yes
Consent: The patient's consent was obtained including but not limited to risks of crusting, scabbing, blistering, scarring, darker or lighter pigmentary change, recurrence, incomplete removal and infection.
Render Note In Bullet Format When Appropriate: No
Detail Level: Simple
Duration Of Freeze Thaw-Cycle (Seconds): 0
Post-Care Instructions: I reviewed with the patient in detail post-care instructions. Patient is to wear sunprotection, and avoid picking at any of the treated lesions. Pt may apply Vaseline to crusted or scabbing areas.

## 2022-05-09 NOTE — PROCEDURE: MEDICATION COUNSELING
I called the pt phone. I rang once and then vm picked up but I cannot leave a mssg. Her vm is not set up    I called and left a vm for daughter Vivian Muniz   ( emergency contact) to have pt return my call.    Propranolol Counseling:  I discussed with the patient the risks of propranolol including but not limited to low heart rate, low blood pressure, low blood sugar, restlessness and increased cold sensitivity. They should call the office if they experience any of these side effects.

## 2022-05-17 ENCOUNTER — APPOINTMENT (OUTPATIENT)
Dept: URBAN - METROPOLITAN AREA CLINIC 265 | Age: 77
Setting detail: DERMATOLOGY
End: 2022-05-17

## 2022-05-17 VITALS — HEIGHT: 72 IN | RESPIRATION RATE: 18 BRPM | WEIGHT: 160 LBS

## 2022-05-17 DIAGNOSIS — L57.0 ACTINIC KERATOSIS: ICD-10-CM

## 2022-05-17 PROCEDURE — OTHER COUNSELING: OTHER

## 2022-05-17 PROCEDURE — OTHER LIQUID NITROGEN: OTHER

## 2022-05-17 PROCEDURE — 17003 DESTRUCT PREMALG LES 2-14: CPT

## 2022-05-17 PROCEDURE — 17000 DESTRUCT PREMALG LESION: CPT

## 2022-05-17 PROCEDURE — OTHER MIPS QUALITY: OTHER

## 2022-05-17 ASSESSMENT — LOCATION SIMPLE DESCRIPTION DERM
LOCATION SIMPLE: LEFT CHEEK
LOCATION SIMPLE: RIGHT TEMPLE
LOCATION SIMPLE: RIGHT SCALP
LOCATION SIMPLE: NOSE
LOCATION SIMPLE: SCALP
LOCATION SIMPLE: RIGHT CHEEK
LOCATION SIMPLE: RIGHT EAR
LOCATION SIMPLE: LEFT EAR
LOCATION SIMPLE: RIGHT FOREHEAD
LOCATION SIMPLE: LEFT FOREHEAD

## 2022-05-17 ASSESSMENT — LOCATION DETAILED DESCRIPTION DERM
LOCATION DETAILED: LEFT SUPERIOR HELIX
LOCATION DETAILED: LEFT ANTIHELIX
LOCATION DETAILED: LEFT MEDIAL MALAR CHEEK
LOCATION DETAILED: RIGHT MEDIAL MALAR CHEEK
LOCATION DETAILED: LEFT INFERIOR LATERAL FOREHEAD
LOCATION DETAILED: RIGHT FOREHEAD
LOCATION DETAILED: NASAL SUPRATIP
LOCATION DETAILED: RIGHT INFERIOR LATERAL FOREHEAD
LOCATION DETAILED: RIGHT INFERIOR HELIX
LOCATION DETAILED: RIGHT SUPERIOR PARIETAL SCALP
LOCATION DETAILED: LEFT FOREHEAD
LOCATION DETAILED: RIGHT MEDIAL FRONTAL SCALP
LOCATION DETAILED: RIGHT SUPERIOR FOREHEAD
LOCATION DETAILED: RIGHT CENTRAL TEMPLE

## 2022-05-17 ASSESSMENT — LOCATION ZONE DERM
LOCATION ZONE: EAR
LOCATION ZONE: NOSE
LOCATION ZONE: FACE
LOCATION ZONE: SCALP

## 2022-05-17 NOTE — PROCEDURE: LIQUID NITROGEN
Render Note In Bullet Format When Appropriate: No
Number Of Freeze-Thaw Cycles: 2 freeze-thaw cycles
Detail Level: Simple
Duration Of Freeze Thaw-Cycle (Seconds): 3
Show Aperture Variable?: Yes
Post-Care Instructions: I reviewed with the patient in detail post-care instructions. Patient is to wear sunprotection, and avoid picking at any of the treated lesions. Pt may apply Vaseline to crusted or scabbing areas.
Consent: The patient's consent was obtained including but not limited to risks of crusting, scabbing, blistering, scarring, darker or lighter pigmentary change, recurrence, incomplete removal and infection.

## 2022-07-13 ENCOUNTER — APPOINTMENT (OUTPATIENT)
Dept: URBAN - METROPOLITAN AREA CLINIC 265 | Age: 77
Setting detail: DERMATOLOGY
End: 2022-07-13

## 2022-07-13 VITALS — HEIGHT: 72 IN | RESPIRATION RATE: 18 BRPM | WEIGHT: 144 LBS

## 2022-07-13 DIAGNOSIS — L30.8 OTHER SPECIFIED DERMATITIS: ICD-10-CM

## 2022-07-13 DIAGNOSIS — L57.0 ACTINIC KERATOSIS: ICD-10-CM

## 2022-07-13 DIAGNOSIS — L81.4 OTHER MELANIN HYPERPIGMENTATION: ICD-10-CM

## 2022-07-13 DIAGNOSIS — L82.1 OTHER SEBORRHEIC KERATOSIS: ICD-10-CM

## 2022-07-13 DIAGNOSIS — D22 MELANOCYTIC NEVI: ICD-10-CM

## 2022-07-13 PROBLEM — D22.62 MELANOCYTIC NEVI OF LEFT UPPER LIMB, INCLUDING SHOULDER: Status: ACTIVE | Noted: 2022-07-13

## 2022-07-13 PROBLEM — D22.61 MELANOCYTIC NEVI OF RIGHT UPPER LIMB, INCLUDING SHOULDER: Status: ACTIVE | Noted: 2022-07-13

## 2022-07-13 PROCEDURE — OTHER MIPS QUALITY: OTHER

## 2022-07-13 PROCEDURE — 17000 DESTRUCT PREMALG LESION: CPT

## 2022-07-13 PROCEDURE — OTHER LIQUID NITROGEN: OTHER

## 2022-07-13 PROCEDURE — 99213 OFFICE O/P EST LOW 20 MIN: CPT | Mod: 25

## 2022-07-13 PROCEDURE — OTHER COUNSELING: OTHER

## 2022-07-13 PROCEDURE — 17003 DESTRUCT PREMALG LES 2-14: CPT

## 2022-07-13 PROCEDURE — OTHER PRESCRIPTION MEDICATION MANAGEMENT: OTHER

## 2022-07-13 PROCEDURE — OTHER MEDICATION COUNSELING: OTHER

## 2022-07-13 ASSESSMENT — LOCATION DETAILED DESCRIPTION DERM
LOCATION DETAILED: LEFT DISTAL POSTERIOR UPPER ARM
LOCATION DETAILED: LEFT PROXIMAL DORSAL FOREARM
LOCATION DETAILED: LEFT CENTRAL MALAR CHEEK
LOCATION DETAILED: RIGHT SUPERIOR LATERAL MALAR CHEEK
LOCATION DETAILED: POSTERIOR MID-PARIETAL SCALP
LOCATION DETAILED: LEFT PROXIMAL POSTERIOR UPPER ARM
LOCATION DETAILED: RIGHT PROXIMAL POSTERIOR UPPER ARM
LOCATION DETAILED: RIGHT SUPERIOR HELIX
LOCATION DETAILED: LEFT SUPERIOR CRUS OF ANTIHELIX
LOCATION DETAILED: LEFT CENTRAL TEMPLE
LOCATION DETAILED: RIGHT SUPERIOR MEDIAL MALAR CHEEK
LOCATION DETAILED: LEFT MEDIAL TEMPLE
LOCATION DETAILED: LEFT SUPERIOR HELIX
LOCATION DETAILED: RIGHT PROXIMAL DORSAL FOREARM
LOCATION DETAILED: RIGHT SUPERIOR PARIETAL SCALP
LOCATION DETAILED: NASAL DORSUM
LOCATION DETAILED: RIGHT DISTAL POSTERIOR UPPER ARM

## 2022-07-13 ASSESSMENT — LOCATION ZONE DERM
LOCATION ZONE: SCALP
LOCATION ZONE: NOSE
LOCATION ZONE: ARM
LOCATION ZONE: FACE
LOCATION ZONE: EAR

## 2022-07-13 ASSESSMENT — LOCATION SIMPLE DESCRIPTION DERM
LOCATION SIMPLE: LEFT UPPER ARM
LOCATION SIMPLE: LEFT EAR
LOCATION SIMPLE: SCALP
LOCATION SIMPLE: LEFT TEMPLE
LOCATION SIMPLE: RIGHT FOREARM
LOCATION SIMPLE: LEFT FOREARM
LOCATION SIMPLE: LEFT CHEEK
LOCATION SIMPLE: RIGHT UPPER ARM
LOCATION SIMPLE: RIGHT EAR
LOCATION SIMPLE: RIGHT CHEEK
LOCATION SIMPLE: POSTERIOR SCALP
LOCATION SIMPLE: NOSE

## 2022-07-13 NOTE — PROCEDURE: MEDICATION COUNSELING
6/2/2020  9/15/2020 Valtrex Counseling: I discussed with the patient the risks of valacyclovir including but not limited to kidney damage, nausea, vomiting and severe allergy.  The patient understands that if the infection seems to be worsening or is not improving, they are to call.

## 2022-07-13 NOTE — PROCEDURE: PRESCRIPTION MEDICATION MANAGEMENT
Detail Level: Zone
Continue Regimen: Hydrocortisone 2.5% cream, apply to affected areas of scalp twice a day x 14 days, stop x 7 days, repeat as needed
Plan: Return to clinic in 3 months. Patient declining full skin exams at this time.
Render In Strict Bullet Format?: No

## 2022-07-18 ENCOUNTER — RX ONLY (RX ONLY)
Age: 77
End: 2022-07-18

## 2022-07-18 RX ORDER — HYDROCORTISONE 25 MG/G
OINTMENT TOPICAL
Qty: 454 | Refills: 3 | Status: ERX

## 2023-03-30 NOTE — PROCEDURE: MEDICATION COUNSELING
Gabapentin Counseling: I discussed with the patient the risks of gabapentin including but not limited to dizziness, somnolence, fatigue and ataxia. [FreeTextEntry2] : mri eval

## 2023-07-07 NOTE — PROCEDURE: MEDICATION COUNSELING
oral Erivedge Counseling- I discussed with the patient the risks of Erivedge including but not limited to nausea, vomiting, diarrhea, constipation, weight loss, changes in the sense of taste, decreased appetite, muscle spasms, and hair loss.  The patient verbalized understanding of the proper use and possible adverse effects of Erivedge.  All of the patient's questions and concerns were addressed.

## 2023-08-27 NOTE — PROCEDURE: MEDICATION COUNSELING
08/27/23 1230   Pain Assessment   Pain Assessment Tool 0-10   Pain Score No Pain   Restrictions/Precautions   Precautions Fall Risk;Bed/chair alarms  (R side inattention)   Braces or Orthoses Sling  (RUE hemiplegia for tx only)   Cognition   Overall Cognitive Status WFL   Arousal/Participation Alert; Cooperative   Attention Attends with cues to redirect   Orientation Level Oriented X4   Memory Decreased short term memory   Following Commands Follows one step commands with increased time or repetition   Subjective   Subjective Pt ready for PT   Roll Left and Right   Type of Assistance Needed Physical assistance   Physical Assistance Level Total assistance   Roll Left and Right CARE Score 1   Sit to Lying   Type of Assistance Needed Physical assistance   Physical Assistance Level Total assistance   Sit to Lying CARE Score 1   Lying to Sitting on Side of Bed   Type of Assistance Needed Physical assistance   Physical Assistance Level Total assistance   Comment assist for RLE, 1 therapist anterior 1 therapist posterior poor trunk control   Lying to Sitting on Side of Bed CARE Score 1   Sit to Stand   Type of Assistance Needed Physical assistance   Physical Assistance Level Total assistance   Comment max assist of 2, at bed rail, at parallel bars on 4" step   Sit to Stand CARE Score 1   Bed-Chair Transfer   Type of Assistance Needed Physical assistance   Physical Assistance Level Total assistance   Comment mod-max assist of 2 with slide board bed to commode, commode to bed max assist of 2   Chair/Bed-to-Chair Transfer CARE Score 1   Wheel 50 Feet with Two Turns   Type of Assistance Needed Physical assistance   Physical Assistance Level 26%-50%   Wheel 50 Feet with Two Turns CARE Score 3   Wheel 150 Feet   Type of Assistance Needed Physical assistance   Physical Assistance Level 51%-75%   Wheel 150 Feet CARE Score 2   Wheelchair mobility   Type of Wheelchair Used 1.  Manual   Method Left upper extremity   Assistance Provided For Obstacles;Curbs;Replace Leg Rest;Remove Leg Rest;Remove armrests;Replace armrests   Distance Level Surface (feet) 150 ft  (x2)   Toilet Transfer   Type of Assistance Needed Physical assistance   Physical Assistance Level Total assistance   Comment Ax2 slide board   Toilet Transfer CARE Score 1   Therapeutic Interventions   Strengthening all exercises 5 sets of 5: supine glut sets (therapist stabilization RLE), LLE SLR, ankle df/pf, hip abduction/adduction   Neuromuscular Re-Education PROM of RLE in all planes supine however with patient thinking of contraction to focus on improvement of neuromuscular recruitment   Other 4" step used because mattress height too high for patient to reach floor. w/c height also too high for patient to reach floor. Equipment Use   Parallel Bars x3 transfers pulling up with LUE, max assist of 1 and stand by of another for total assistance, 30 s, 45 s x2; max verbal cues for upright posture and glute recruitment   Assessment   Treatment Assessment [place] [facility] Ann seen for PT session, initally co treat with OT then individual PT session. Pt continues to require max assist of 2 for transfers with slide board however demonstrated improved progress with repeated sit to stand transfers, using bed rail and parallel bars to pull up from with LUE. Blocking of BLEs needed. Pt able to statically stand with max assist at this time but limited by fatigue and decreased endurance. Pt would benefit from continued skilled PT to maximize independence and decrease risk of falls. Session also focused on LE strengthening to prepare for transfers and body weight exercises. Problem List Decreased strength;Decreased range of motion;Decreased endurance; Impaired balance;Decreased mobility; Decreased coordination;Decreased cognition; Impaired judgement   Barriers to Discharge Inaccessible home environment;Decreased caregiver support   PT Barriers   Functional Limitation Car transfers; Ramp negotiation;Stair negotiation;Standing;Transfers; Walking   Plan   Treatment/Interventions Therapeutic exercise; Functional transfer training;LE strengthening/ROM;ADL retraining;Equipment eval/education;Patient/family training;Bed mobility   Progress Slow progress, decreased activity tolerance   PT Therapy Minutes   PT Time In 1230   PT Time Out 1400   PT Total Time (minutes) 90   PT Mode of treatment - Individual (minutes) 30   PT Mode of treatment - Concurrent (minutes) 0   PT Mode of treatment - Group (minutes) 0   PT Mode of treatment - Co-treat (minutes) 60   PT Mode of Treatment - Total time(minutes) 90 minutes   PT Cumulative Minutes 210   Therapy Time missed   Time missed?  No Ketoconazole Pregnancy And Lactation Text: This medication is Pregnancy Category C and it isn't know if it is safe during pregnancy. It is also excreted in breast milk and breast feeding isn't recommended.

## 2023-10-04 NOTE — PROCEDURE: PRESCRIPTION MEDICATION MANAGEMENT
Initiate Treatment: prednisone 10 mg tablet, take 4 pills (40mg) x 4days, then 3 pills (30mg) x 4days, then 2 pills (20mg) x 4days, then 1 pill (10mg) x 4days, then stop
Detail Level: Zone
Render In Strict Bullet Format?: No
Plan: Will re-evaluate pending pathology results.
Picato Counseling:  I discussed with the patient the risks of Picato including but not limited to erythema, scaling, itching, weeping, crusting, and pain.

## 2023-10-19 NOTE — PROCEDURE: MEDICATION COUNSELING
Patient is identified as having Diastolic (HFpEF) heart failure that is Acute on chronic. CHF is currently controlled. Latest ECHO performed and demonstrates- Results for orders placed during the hospital encounter of 09/11/23    Echo    Interpretation Summary    Left Ventricle: The left ventricle is smaller than normal. Moderately increased ventricular mass. Moderately increased wall thickness. There is moderate concentric hypertrophy. Normal wall motion. There is normal systolic function with a visually estimated ejection fraction of 65 - 70%. Grade II diastolic dysfunction.    Left Atrium: Left atrium is moderately dilated.    Right Ventricle: Normal right ventricular cavity size. Wall thickness is normal. Right ventricle wall motion  is normal. Systolic function is normal.    Aortic Valve: The aortic valve is a trileaflet valve. Moderately calcified cusps. There is moderate stenosis. Aortic valve area by VTI is 0.86 cm². Aortic valve peak velocity is 2.42 m/s. Mean gradient is 13 mmHg. The dimensionless index is 0.38.    Mitral Valve: There is severe mitral annular calcification present.    Tricuspid Valve: There is moderate regurgitation.    IVC/SVC: Normal venous pressure at 3 mmHg.  . Continue Beta Blocker and Furosemide and monitor clinical status closely. Monitor on telemetry. Patient is on CHF pathway.  Monitor strict Is&Os and daily weights.  Place on fluid restriction of 1.5 L. Cardiology has been consulted. Continue to stress to patient importance of self efficacy and  on diet for CHF. Last BNP reviewed- and noted below   Recent Labs   Lab 10/18/23  1707   *   .  Continue IV Lasix 40 mg b.i.d.   Minoxidil Counseling: Minoxidil is a topical medication which can increase blood flow where it is applied. It is uncertain how this medication increases hair growth. Side effects are uncommon and include stinging and allergic reactions.

## 2023-12-11 NOTE — PROCEDURE: MEDICATION COUNSELING
Pt has question about lab work with zoerid meds.   Carac Pregnancy And Lactation Text: This medication is Pregnancy Category X and contraindicated in pregnancy and in women who may become pregnant. It is unknown if this medication is excreted in breast milk.

## 2025-01-10 NOTE — PROCEDURE: MEDICATION COUNSELING
Clothing Dupixent Counseling: I discussed with the patient the risks of dupilumab including but not limited to eye infection and irritation, cold sores, injection site reactions, worsening of asthma, allergic reactions and increased risk of parasitic infection.  Live vaccines should be avoided while taking dupilumab. Dupilumab will also interact with certain medications such as warfarin and cyclosporine. The patient understands that monitoring is required and they must alert us or the primary physician if symptoms of infection or other concerning signs are noted.

## 2025-07-08 NOTE — PROCEDURE: MEDICATION COUNSELING
Problem: Chronic Conditions and Co-morbidities  Goal: Patient's chronic conditions and co-morbidity symptoms are monitored and maintained or improved  7/8/2025 0012 by Fay Baig RN  Outcome: Progressing  7/7/2025 1054 by Jacey Alicia RN  Outcome: Progressing     Problem: Discharge Planning  Goal: Discharge to home or other facility with appropriate resources  7/8/2025 0012 by Fay Baig RN  Outcome: Progressing  7/7/2025 1054 by Jacey Alicia RN  Outcome: Progressing     Problem: Pain  Goal: Verbalizes/displays adequate comfort level or baseline comfort level  7/8/2025 0012 by Fay Baig RN  Outcome: Progressing  7/7/2025 1054 by Jacey Alicia RN  Outcome: Progressing     Problem: Risk for Elopement  Goal: Patient will not exit the unit/facility without proper excort  7/8/2025 0012 by Fay Baig RN  Outcome: Progressing  7/7/2025 1054 by Jacey Alicia RN  Outcome: Progressing      Tremfya Pregnancy And Lactation Text: The risk during pregnancy and breastfeeding is uncertain with this medication.